# Patient Record
Sex: MALE | Race: AMERICAN INDIAN OR ALASKA NATIVE
[De-identification: names, ages, dates, MRNs, and addresses within clinical notes are randomized per-mention and may not be internally consistent; named-entity substitution may affect disease eponyms.]

---

## 2017-09-01 ENCOUNTER — HOSPITAL ENCOUNTER (EMERGENCY)
Dept: HOSPITAL 14 - H.ER | Age: 42
Discharge: HOME | End: 2017-09-01
Payer: MEDICAID

## 2017-09-01 VITALS
SYSTOLIC BLOOD PRESSURE: 148 MMHG | DIASTOLIC BLOOD PRESSURE: 65 MMHG | RESPIRATION RATE: 20 BRPM | OXYGEN SATURATION: 99 % | TEMPERATURE: 97 F | HEART RATE: 81 BPM

## 2017-09-01 VITALS — BODY MASS INDEX: 24.1 KG/M2

## 2017-09-01 DIAGNOSIS — Z79.01: Primary | ICD-10-CM

## 2017-09-01 DIAGNOSIS — I10: ICD-10-CM

## 2017-09-01 DIAGNOSIS — Z51.81: ICD-10-CM

## 2017-09-01 LAB — APTT BLD: 38.4 SECONDS (ref 25.6–37.1)

## 2017-09-01 NOTE — ED PDOC
HPI: General Adult


Time Seen by Provider: 09/01/17 12:09


Chief Complaint (Nursing): Medical Clearance


Chief Complaint (Provider): lab


Additional Complaint(s): 





42yoM in ED sent from clinic for PT/INR/PTT and to get coumadin Rx. 


no medical complaints at this time. 





Past Medical History


Reviewed: Historical Data, Nursing Documentation, Vital Signs


Vital Signs: 


 Last Vital Signs











Temp  97.0 F L  09/01/17 12:03


 


Pulse  81   09/01/17 12:03


 


Resp  20   09/01/17 12:03


 


BP  148/65   09/01/17 12:03


 


Pulse Ox  99   09/01/17 13:20














- Medical History


PMH: HTN





- Family History


Family History: States: No Known Family Hx





- Immunization History


Hx Tetanus Toxoid Vaccination: Yes (2 years ago)





- Home Medications


Home Medications: 


 Ambulatory Orders











 Medication  Instructions  Recorded


 


Warfarin [Coumadin] 7.5 mg PO DAILY #8 tab 09/01/17














- Allergies


Allergies/Adverse Reactions: 


 Allergies











Allergy/AdvReac Type Severity Reaction Status Date / Time


 


No Known Allergies Allergy   Verified 09/12/14 19:20














Review of Systems


ROS Statement: Except As Marked, All Systems Reviewed And Found Negative


Constitutional: Negative for: Fever, Weakness


Skin: Negative for: Bruising





Physical Exam





- Reviewed


Nursing Documentation Reviewed: Yes


Vital Signs Reviewed: Yes





- Physical Exam


Appears: Positive for: Well, Non-toxic, No Acute Distress


Skin: Positive for: Normal Color, Warm, DRY


Cardiovascular/Chest: Positive for: Regular Rate, Rhythm


Respiratory: Positive for: CNT, Normal Breath Sounds


Extremity: Positive for: Normal ROM


Neurologic/Psych: Positive for: Alert, Oriented





- ECG


O2 Sat by Pulse Oximetry: 99





- Progress


ED Course And Treament: 





13:15


Upon provider reevaluation patient is feeling better, is medically stable, and 

requires no further treatment in the ED at this time. Patient was counseled to 

take Coumadin at 7.5 mg and all questions were answered regarding diagnosis. 

There is agreement to discharge plan. Return if symptoms persist or worsen.

















  09/01/17





  12:20


 


PT  25.4 H


 


INR  2.4 H


 


APTT  38.4 H








Scribe Attestation:


Documented by Jasmyn Ceballos, acting as a scribe for DAVID Cerda.





Provider Scribe Attestation:


All medical record entries made by the Scribe were at my direction and 

personally dictated by me. I have reviewed the chart and agree that the record 

accurately reflects my personal performance of the history, physical exam, 

medical decision making, and the department course for this patient. I have 

also personally directed, reviewed, and agree with the discharge instructions 

and disposition.





Disposition





- Clinical Impression


Clinical Impression: 


 History of Coumadin therapy, Anticoagulation monitoring, INR range 2.5-3.5








- Patient ED Disposition


Is Patient to be Admitted: No


Doctor Will See Patient In The: Office


Counseled Patient/Family Regarding: Studies Performed, Need For Followup





- Disposition


Disposition: Routine/Home


Disposition Time: 13:15


Condition: STABLE


Additional Instructions: 


It is imperative you follow up with your doctor in in 3 days for a repeat INR. 

if you are dizzy please return to ER. 


Prescriptions: 


Warfarin [Coumadin] 7.5 mg PO DAILY #8 tab


Instructions:  Warfarin (By mouth)


Forms:  [facility] (English)

## 2018-02-21 ENCOUNTER — HOSPITAL ENCOUNTER (EMERGENCY)
Dept: HOSPITAL 14 - H.ER | Age: 43
Discharge: HOME | End: 2018-02-21
Payer: MEDICAID

## 2018-02-21 VITALS — OXYGEN SATURATION: 98 %

## 2018-02-21 VITALS
HEART RATE: 87 BPM | DIASTOLIC BLOOD PRESSURE: 89 MMHG | SYSTOLIC BLOOD PRESSURE: 134 MMHG | RESPIRATION RATE: 20 BRPM | TEMPERATURE: 98.4 F

## 2018-02-21 VITALS — BODY MASS INDEX: 22.4 KG/M2

## 2018-02-21 DIAGNOSIS — I10: ICD-10-CM

## 2018-02-21 DIAGNOSIS — L02.01: Primary | ICD-10-CM

## 2018-02-21 DIAGNOSIS — Z79.01: ICD-10-CM

## 2018-02-21 NOTE — ED PDOC
HPI: Skin/Bite Injury


Time Seen by Provider: 02/21/18 10:15


Chief Complaint (Nursing): Abnormal Skin Integrity


Chief Complaint (Provider): Abscess


History Per: Patient


History/Exam Limitations: no limitations


Onset/Duration Of Symptoms: Days (x 4-5)


Current Symptoms Are (Timing): Still Present


Location Of Injury: Left: Face (preauricular space)


Quality Of Symptoms: Painful, Swollen


Pain Scale Rating Of: 6


Additional Complaint(s): 





Reece is a 43 y/o male with a history of abscesses who presents to the ED 

complaining of an abscess to the left side of his face for the past 4-5 days. 

Patient states he's been applying warm compresses to his face but has not taken 

any medications for his symptoms. He denies fever, chills, nausea, vomiting, 

diarrhea, headache, or vision changes.





PMD: Ravindra





Past Medical History


Reviewed: Historical Data, Nursing Documentation, Vital Signs


Vital Signs: 


 Last Vital Signs











Temp  98.4 F   02/21/18 09:42


 


Pulse  87   02/21/18 09:42


 


Resp  20   02/21/18 09:42


 


BP  134/89   02/21/18 09:42


 


Pulse Ox  98   02/21/18 11:09














- Medical History


PMH: HTN


Other PMH: MI





- Surgical History


Other surgeries: aortic valve repair, jaw wired shut status post fracture





- Family History


Family History: States: Unknown Family Hx





- Social History


Alcohol: None (history of alcohol abuse, no current use)


Drugs: Cannabis (daily)





- Immunization History


Hx Tetanus Toxoid Vaccination: Yes (2 years ago)





- Home Medications


Home Medications: 


 Ambulatory Orders











 Medication  Instructions  Recorded


 


Warfarin [Coumadin] 7.5 mg PO DAILY #8 tab 09/01/17


 


Clindamycin [Cleocin] 300 mg PO TID #21 cap 02/21/18














- Allergies


Allergies/Adverse Reactions: 


 Allergies











Allergy/AdvReac Type Severity Reaction Status Date / Time


 


No Known Allergies Allergy   Verified 09/12/14 19:20














Review of Systems


ROS Statement: Except As Marked, All Systems Reviewed And Found Negative


Constitutional: Negative for: Fever, Chills, Weakness


Eyes: Negative for: Vision Change


ENT: Negative for: Ear Pain, Throat Pain


Cardiovascular: Negative for: Chest Pain


Respiratory: Negative for: Cough


Gastrointestinal: Negative for: Nausea, Vomiting, Diarrhea


Skin: Positive for: Other (abscess left face)


Neurological: Negative for: Headache





Physical Exam





- Reviewed


Nursing Documentation Reviewed: Yes


Vital Signs Reviewed: Yes





- Physical Exam


Appears: Positive for: Well, Non-toxic, No Acute Distress


Head Exam: Positive for: ATRAUMATIC, NORMOCEPHALIC


Skin: Positive for: Normal Color (1 cm x 1.5 cm fluctuant, erythematous abscess 

to left preauricular space, (+) tenderness, (-) surrounding cellulitis (-) 

active drainage), Warm, Dry


Eye Exam: Positive for: EOMI, PERRL.  Negative for: Nystagmus


ENT: Positive for: Normal ENT Inspection, Pharynx Is (clear, uvula midline).  

Negative for: Nasal Congestion


Neck: Positive for: Painless ROM, Supple


Cardiovascular/Chest: Positive for: Regular Rate, Rhythm.  Negative for: Murmur


Respiratory: Positive for: Normal Breath Sounds.  Negative for: Decreased 

Breath Sounds, Accessory Muscle Use, Respiratory Distress


Gastrointestinal/Abdominal: Positive for: Soft.  Negative for: Tenderness, Mass

, Distended, Guarding


Neurologic/Psych: Positive for: Alert, Oriented, Gait (steady).  Negative for: 

Motor/Sensory Deficits





- ECG


O2 Sat by Pulse Oximetry: 98 (RA)


Pulse Ox Interpretation: Normal





Medical Decision Making


Medical Decision Making: 





Time: 10:27


Initial Impression: Abscess


Initial Plan:


-Cleocin PO


-I&D





I&D performed at bedside by Yossi GALLAGHER with purulent drainage. Patient 

tolerated procedure well. Procedure note below. Educated on wound care. Advised 

follow up in 2 days with PMD/ED/Clinic for packing removal/wound check.





Based on history, exam and diagnostic results, plan will be for outpatient 

follow up. Advised to take medication as prescribed. Return to the emergency 

room at any time for any new or worsening symptoms. Patient states he fully 

agrees with and understands discharge instructions. States that he agrees with 

the plan and disposition. Verbalized and repeated discharge instructions and 

plan. I have given the patient opportunity to ask any additional questions.


--------------------------------------------------------------------------------

-----------------


Scribe Attestation:


Documented by Bayron Bangura, acting as a scribe for Lima Sy PA-C





Provider Scribe Attestation:


All medical record entries made by the Scribe were at my direction and 

personally dictated by me. I have reviewed the chart and agree that the record 

accurately reflects my personal performance of the history, physical exam, 

medical decision making, and the department course for this patient. I have 

also personally directed, reviewed, and agree with the discharge instructions 

and disposition.








Disposition





- Clinical Impression


Clinical Impression: 


 Abscess








- Patient ED Disposition


Is Patient to be Admitted: No


Counseled Patient/Family Regarding: Diagnosis, Need For Followup, Rx Given





- Disposition


Referrals: 


[facility] [Outside]


Disposition: Routine/Home


Disposition Time: 11:02


Condition: STABLE


Additional Instructions: 


Follow up with PMD/ED/clinic in 1-2 days without fail for wound check, packing 

removal. 


Prescriptions: 


Clindamycin [Cleocin] 300 mg PO TID #21 cap


Instructions:  Skin Abscess, Abscess Incision and Drainage (DC)


Forms:  [employer] [facility] (English)


Print Language: ENGLISH





- POA


Present On Arrival: None





Procedures





- Time-Out


Type of Procedure: I&D





- Incision and Drainage


Site: left preauricular space


Blade Size: 11


I & D Procedure: betadine prep, sterile dressing applied


Progress: 





Numbed with 2mL lidoaine 1%


Prepped with betadine


Incised and drained of purulent material


Irrigated after with NS


1/4in packing placed


Dry sterile dressing applied


Patient tolerated procedure well and was advised to follow up in 2 days for 

packing removal at PMD or here